# Patient Record
Sex: MALE | Race: WHITE | Employment: UNEMPLOYED | URBAN - METROPOLITAN AREA
[De-identification: names, ages, dates, MRNs, and addresses within clinical notes are randomized per-mention and may not be internally consistent; named-entity substitution may affect disease eponyms.]

---

## 2017-09-28 ENCOUNTER — HOSPITAL ENCOUNTER (OUTPATIENT)
Dept: SURGERY | Age: 52
Discharge: HOME | End: 2017-09-28

## 2017-09-28 VITALS — WEIGHT: 218 LBS | HEIGHT: 67 IN | BODY MASS INDEX: 34.21 KG/M2

## 2017-09-28 NOTE — PERIOP NOTES
Patient and pt's mother, Shelli Mcgovern, verified name, , and procedure. Type: 1a; abbreviated assessment per anesthesia guidelines  Labs per surgeon: none  Labs per anesthesia: none    Instructed pt that they will be notified via office/GI LAB for time of arrival to GI lab. Follow diet and prep instructions per office. May have clear liquids until 4 hours prior to time of arrival.     [place] R[place] or shower the night before and the am of surgery with antibacterial soap. No lotions, oils, powders, cologne on skin. No make up, eye make up or jewelry. Wear loose fitting comfortable, clean clothing. Must have adult present in building the entire time. Medications for the day of procedure- vytorin; patient to hold- fenofibrate, losartan, actos DOS    The following discharge instructions reviewed with patient: medication given during procedure may cause drowsiness for several hours, therefore, do not drive or operate machinery for remainder of the day. You may not drink alcohol on the day of your procedure, please resume regular diet and activity unless otherwise directed. You may experience abdominal distention for several hours that is relieved by the passage of gas. Contact your physician if you have any of the following: fever or chills, severe abdominal pain or excessive amount of bleeding or a large amount when having a bowel movement.  Occasional specks of blood with bowel movement would not be unusual.

## 2017-10-04 ENCOUNTER — ANESTHESIA EVENT (OUTPATIENT)
Dept: ENDOSCOPY | Age: 52
End: 2017-10-04
Payer: MEDICARE

## 2017-10-05 ENCOUNTER — HOSPITAL ENCOUNTER (OUTPATIENT)
Age: 52
Setting detail: OUTPATIENT SURGERY
Discharge: HOME | End: 2017-10-05
Attending: SURGERY | Admitting: SURGERY
Payer: MEDICARE

## 2017-10-05 ENCOUNTER — ANESTHESIA (OUTPATIENT)
Dept: ENDOSCOPY | Age: 52
End: 2017-10-05
Payer: MEDICARE

## 2017-10-05 VITALS
OXYGEN SATURATION: 97 % | SYSTOLIC BLOOD PRESSURE: 124 MMHG | WEIGHT: 208.56 LBS | HEART RATE: 64 BPM | DIASTOLIC BLOOD PRESSURE: 78 MMHG | TEMPERATURE: 97.5 F | RESPIRATION RATE: 15 BRPM | BODY MASS INDEX: 32.67 KG/M2

## 2017-10-05 LAB — GLUCOSE BLD STRIP.AUTO-MCNC: 86 MG/DL (ref 65–100)

## 2017-10-05 PROCEDURE — 77030009426 HC FCPS BIOP ENDOSC BSC -B: Performed by: SURGERY

## 2017-10-05 PROCEDURE — 76060000032 HC ANESTHESIA 0.5 TO 1 HR: Performed by: SURGERY

## 2017-10-05 PROCEDURE — 74011000250 HC RX REV CODE- 250

## 2017-10-05 PROCEDURE — 82962 GLUCOSE BLOOD TEST: CPT

## 2017-10-05 PROCEDURE — 74011250636 HC RX REV CODE- 250/636

## 2017-10-05 PROCEDURE — 88305 TISSUE EXAM BY PATHOLOGIST: CPT | Performed by: SURGERY

## 2017-10-05 PROCEDURE — 76040000025: Performed by: SURGERY

## 2017-10-05 PROCEDURE — 74011250636 HC RX REV CODE- 250/636: Performed by: ANESTHESIOLOGY

## 2017-10-05 PROCEDURE — 77030011640 HC PAD GRND REM COVD -A: Performed by: SURGERY

## 2017-10-05 RX ORDER — PROPOFOL 10 MG/ML
INJECTION, EMULSION INTRAVENOUS
Status: DISCONTINUED | OUTPATIENT
Start: 2017-10-05 | End: 2017-10-05 | Stop reason: HOSPADM

## 2017-10-05 RX ORDER — SODIUM CHLORIDE 0.9 % (FLUSH) 0.9 %
5-10 SYRINGE (ML) INJECTION AS NEEDED
Status: DISCONTINUED | OUTPATIENT
Start: 2017-10-05 | End: 2017-10-05 | Stop reason: HOSPADM

## 2017-10-05 RX ORDER — OXYCODONE AND ACETAMINOPHEN 10; 325 MG/1; MG/1
1 TABLET ORAL AS NEEDED
Status: DISCONTINUED | OUTPATIENT
Start: 2017-10-05 | End: 2017-10-05 | Stop reason: HOSPADM

## 2017-10-05 RX ORDER — MIDAZOLAM HYDROCHLORIDE 1 MG/ML
2 INJECTION, SOLUTION INTRAMUSCULAR; INTRAVENOUS ONCE
Status: COMPLETED | OUTPATIENT
Start: 2017-10-05 | End: 2017-10-05

## 2017-10-05 RX ORDER — LIDOCAINE HYDROCHLORIDE 20 MG/ML
INJECTION, SOLUTION EPIDURAL; INFILTRATION; INTRACAUDAL; PERINEURAL AS NEEDED
Status: DISCONTINUED | OUTPATIENT
Start: 2017-10-05 | End: 2017-10-05 | Stop reason: HOSPADM

## 2017-10-05 RX ORDER — HYDROMORPHONE HYDROCHLORIDE 2 MG/ML
0.5 INJECTION, SOLUTION INTRAMUSCULAR; INTRAVENOUS; SUBCUTANEOUS
Status: DISCONTINUED | OUTPATIENT
Start: 2017-10-05 | End: 2017-10-05 | Stop reason: HOSPADM

## 2017-10-05 RX ORDER — PROPOFOL 10 MG/ML
INJECTION, EMULSION INTRAVENOUS AS NEEDED
Status: DISCONTINUED | OUTPATIENT
Start: 2017-10-05 | End: 2017-10-05 | Stop reason: HOSPADM

## 2017-10-05 RX ORDER — SODIUM CHLORIDE, SODIUM LACTATE, POTASSIUM CHLORIDE, CALCIUM CHLORIDE 600; 310; 30; 20 MG/100ML; MG/100ML; MG/100ML; MG/100ML
75 INJECTION, SOLUTION INTRAVENOUS CONTINUOUS
Status: DISCONTINUED | OUTPATIENT
Start: 2017-10-05 | End: 2017-10-05 | Stop reason: HOSPADM

## 2017-10-05 RX ORDER — OXYCODONE HYDROCHLORIDE 5 MG/1
5 TABLET ORAL
Status: DISCONTINUED | OUTPATIENT
Start: 2017-10-05 | End: 2017-10-05 | Stop reason: HOSPADM

## 2017-10-05 RX ADMIN — MIDAZOLAM HYDROCHLORIDE 2 MG: 1 INJECTION, SOLUTION INTRAMUSCULAR; INTRAVENOUS at 10:03

## 2017-10-05 RX ADMIN — PROPOFOL 160 MCG/KG/MIN: 10 INJECTION, EMULSION INTRAVENOUS at 10:15

## 2017-10-05 RX ADMIN — SODIUM CHLORIDE, SODIUM LACTATE, POTASSIUM CHLORIDE, AND CALCIUM CHLORIDE 75 ML/HR: 600; 310; 30; 20 INJECTION, SOLUTION INTRAVENOUS at 10:02

## 2017-10-05 RX ADMIN — LIDOCAINE HYDROCHLORIDE 40 MG: 20 INJECTION, SOLUTION EPIDURAL; INFILTRATION; INTRACAUDAL; PERINEURAL at 10:15

## 2017-10-05 RX ADMIN — PROPOFOL 40 MG: 10 INJECTION, EMULSION INTRAVENOUS at 10:15

## 2017-10-05 RX ADMIN — PROPOFOL 50 MG: 10 INJECTION, EMULSION INTRAVENOUS at 10:14

## 2017-10-05 NOTE — ANESTHESIA PREPROCEDURE EVALUATION
Anesthetic History   No history of anesthetic complications            Review of Systems / Medical History  Patient summary reviewed and pertinent labs reviewed    Pulmonary  Within defined limits                 Neuro/Psych   Within defined limits           Cardiovascular    Hypertension: well controlled          CAD    Exercise tolerance: >4 METS  Comments: Denies CAD-nl perfusion, nl EF on nuclear stress 2013   GI/Hepatic/Renal                Endo/Other    Diabetes: well controlled, type 2         Other Findings              Physical Exam    Airway  Mallampati: II  TM Distance: > 6 cm    Mouth opening: Normal     Cardiovascular    Rhythm: regular           Dental    Dentition: Poor dentition     Pulmonary                 Abdominal  GI exam deferred       Other Findings            Anesthetic Plan    ASA: 2  Anesthesia type: total IV anesthesia          Induction: Intravenous  Anesthetic plan and risks discussed with: Patient

## 2017-10-05 NOTE — H&P
Donovan Cortes MD  [facility]  [place], [address], [place]  [phone]     Jenny Raines  MRN: [id number]  I have seen and examined this patient today and imported their H&P from clinic below. I have reviewed it and there are no interval changes. I have gone over the risks and benefits of the procedure in detail in the office and have given the patient the opportunity to ask questions and have answered them to their satisfaction. The patient is cleared and wishes to proceed with a colonoscopy for the indication of screening today. Georgette Kellogg MD      PCP: Russel Jones MD     HISTORY OF PRESENT ILLNESS:  Jenny Raines is a 50y.o. year old male sent to me in consultation from Russel Jones MD  to discuss colorectal cancer screening and colonoscopy. The patient has not had a colonoscopy before. no family history of colon/rectal cancer. no family history of colon/rectal polyps. There is no history of HNPCC associated cancers.       There have been no changes in bowel habits; no problems with diarrhea, constipation, or change in stool size. No bleeding, no hematochezia, no melena. No weight loss, fevers, chills, or other constitutional symptoms. No abdominal pain/cramping. No nausea/vomiting. No perianal itching/burning, protrusion/swelling, or rectal discharge. No incontinence of gas/liquid/solid. Has normal bowel movements. Stool consistency is soft. No straining for BMs. Dietary fiber only. No fiber supplements. No stool softeners, laxatives, or enemas. No pain with BM.       REVIEW OF SYSTEMS:  Constitutional: No fevers/chills, no weakness, no fatigue, no lightheadedness, no night sweats, no insomnia, no appetite changes, no weight changes, no memory problems.    Eyes: No changes in vision, no diplopia, no tearing, no scotomata, no pain   Ears/Nose/Throat/Mouth:  No change in hearing, no tinnitus, no bleeding, no epistaxis, no nasal discharge, no sinusitis. Respiratory: No cough, no dyspnea, no wheezing, no hemoptysis, no sleep apnea   Cardiovascular: No chest pains, no chest pressure, no chest tightness, no palpitations   Gastrointestinal: No abdominal pains, no bowel habit changes, no nausea, no emesis, no hematochezia, no melena, no cramping, no constipation, no diarrhea, no incontinence, no jaundice, no diverticulosis. Otherwise per HPI   Genitourinary: No dysuria, no hematuria, no urinary frequency, no nocturia, no recent UTIs   Musculoskeletal: No back/neck pain, no arthritis, no joint pain/swelling, no muscle pains   Skin: No rashes, no itching, no ulcers   Hematologic:  No easy bruising, no anemia   Neurologic: No headaches, no dizziness, no seizures   Psychiatric: No depressive symptoms, no anxiety symptoms, no changes in mood, no substance abuse      PAST MEDICAL HISTORY:          Past Medical History:   Diagnosis Date    Diabetes ([facility] [address]      Hypercholesterolemia      Hypertension           PAST SURGICAL HISTORY:           Past Surgical History:   Procedure Laterality Date    HX ORTHOPAEDIC Right 1997     right leg surgery    HX OTHER SURGICAL         Lazy eye surgery-left eye    HX TONSILLECTOMY             ALLERGIES: No Known Allergies     HOME MEDICATIONS:        Current Outpatient Prescriptions   Medication Sig    pioglitazone (ACTOS) 30 mg tablet TAKE 1 TABLET BY MOUTH EVERY DAY    fenofibrate micronized (LOFIBRA) 134 mg capsule Take 1 Cap by mouth every morning.  losartan (COZAAR) 50 mg tablet Take 1 Tab by mouth daily.  levocetirizine (XYZAL) 5 mg tablet Take 1 Tab by mouth daily.  Indications: ALLERGIC RHINITIS    glucose blood VI test strips (ONETOUCH ULTRA TEST) strip Use daily for management of Type 2 DM  (E11.9)    ezetimibe-simvastatin (VYTORIN 10/40) 10-40 mg per tablet TAKE 1 TABLET BY MOUTH EVERY DAY    cholecalciferol, VITAMIN D3, (VITAMIN D3) 5,000 unit tab tablet Take  by mouth daily. Two a day      No current facility-administered medications for this visit.          SOCIAL HISTORY:    Social History            Social History    Marital status: SINGLE       Spouse name: N/A    Number of children: N/A    Years of education: N/A          Occupational History    Not on file.           Social History Main Topics    Smoking status: Never Smoker    Smokeless tobacco: Never Used    Alcohol use No    Drug use: Not on file    Sexual activity: Not on file           Other Topics Concern    Not on file      Social History Narrative               Family History   Problem Relation Age of Onset    Diabetes Mother           PHYSICAL EXAM:  Blood pressure 112/65, pulse (!) 55, height 5' 10\" (1.778 m), weight 215 lb 1.9 oz (97.6 kg). Body mass index is 30.87 kg/(m^2). All female patients are examined in the presence of my Nurse or a medical assistant and at the request of male patients. General: Normotensive, no acute distress, well developed, well nourished appearing   Head:  AT/NC, no lesions   Eyes:  PERRLA, EOM's full, conjunctivae clear   Neck:  Supple, no masses, no lymphadenopathy, no thyromegaly, no carotid bruits   Chest:  Lungs clear, no rales, no rhonchi, no wheezes   Heart:  RRR, no rubs, no gallops   Abdomen:  Soft, no tenderness, no rebound, no guarding, no masses, nondistended. Rectal:  deferred   Back:  Normal curvature, no tenderness. Negative Ho's punch. Extremities:  FROM, no deformities, no edema, no erythema   Neuro:  Physiologic, oriented x3, full affect, no localizing findings   Skin:  No rash                  ASSESSMENT:  Age related colon cancer risk/screening   As well as risk factors related to family history and current symptoms. Were discussed. PLAN:  --The patient was counseled on the risks and benefits of colonoscopy.   Major risks include bleeding (1/1000), perforation (1/1000), missed lesions, and reactions to sedation medications or bowel prep. I explained the risks and benefits of colonoscopy. - All questions were answered to the patient's satisfaction. The colonoscopy will be scheduled at the patients next convenient time. The patient will follow up with me to discuss any pathology. If it is benign the patient is given the option to receive a phone call instead of the visit.     Fax communication sent to Perla Singh MD       Greater than 50% of this 30 minute visit was spent on counseling, discussing the rationale behind colon cancer screening, the colonoscopy procedure, risks, benefits, and alternatives.       Donovan Sánchez MD

## 2017-10-05 NOTE — LETTER
10/5/2017 10:34 AM 
 
RE:    Raimundo Rosa [address] [facility] [place] [facility] [address] Donovan Montoya MD 
[facility] Endoscopy Director of Robotic Surgery [facility], [address] [place] [address] 
[phone] You had one small polyp which was biopsied and sent for pathology. I will call you with results next week. I believe that it will be a 5 year follow up but will update you next week. You have NO Diverticulosis which we briefly discussed in office. It is a benign process in which too much fat and too little fiber cause small weak areas in the sigmoid colon. Most people never have any issues. It is very common and most people by the age of 61 will have them is the [place]  Rarely they can become infected and cause Diverticulitis with fevers and left lower or mid lower abdominal pain that persists for more than a day. It is usually treated by your Primary Doctor with oral antibiotics. I advise you to take supplemental fiber in powder form everyday for colon health and a stool softener daily as well to keep your stools consistently soft. How long? Forever and ever amen. You also have NO internal hemorrhoids which if they flare up may cause itching or burning and rarely some bleeding. The treatment is the same as above. Call me if you have questions tomorrow that are not urgent. Otherwise my nurse will be taking calls for me until 5 pm and can get a hold of me. Thank you very much for choosing my office for your colonoscopy screening needs.    
 
Reta Sanz MD

## 2017-10-05 NOTE — Clinical Note
Radha Rasheed, He had one small polyp in the sigmoid otherwise normal.  I will update both of you and the HM when the pathology returns. Thanks for sending him. Yaron Serrano.

## 2017-10-05 NOTE — IP AVS SNAPSHOT
[address] 
[phone] Patient: Arpan Castro MRN: [id number] :1965 You are allergic to the following No active allergies Recent Documentation Weight BMI Smoking Status 94.6 kg 32.67 kg/m2 Never Smoker Emergency Contacts Name Discharge Info Relation Home Work Mobile Azucena Ochoa DISCHARGE CAREGIVER [3] Mother [14] [phone] About your hospitalization You were admitted on:  2017 You last received care in the:  [facility] ENDOSCOPY You were discharged on:  2017 Unit phone number:  [phone] Why you were hospitalized Your primary diagnosis was:  Not on File Providers Seen During Your Hospitalizations Provider Role Specialty Primary office phone Nedra Lira MD Attending Provider General Surgery [phone] Your Primary Care Physician (PCP) Primary Care Physician Office Phone Office Fax [address] [address] [phone] Follow-up Information Follow up With Details Comments Contact Info Nedra Lira MD   [address] [place] [address] [address] [facility] [place] [address] 
[phone] Your Appointments 2017  9:15 AM EDT  
LAB with [facility] ([address] [place] [address]) [address] [place] [id number] [phone] Current Discharge Medication List  
  
ASK your doctor about these medications Dose & Instructions Dispensing Information Comments Morning Noon Evening Bedtime  
 cholecalciferol (VITAMIN D3) 5,000 unit Tab tablet Commonly known as:  VITAMIN D3 Your last dose was: Your next dose is: Take  by mouth daily. Two a day Refills:  0  
     
   
   
   
  
 ezetimibe-simvastatin 10-40 mg per tablet Commonly known as:  Vytorin 10/40 Your last dose was: Your next dose is: TAKE 1 TABLET BY MOUTH EVERY DAY Quantity:  30 Tab Refills:  5  
     
   
   
   
  
 fenofibrate micronized 134 mg capsule Commonly known as:  LOFIBRA Your last dose was: Your next dose is:    
   
   
 Dose:  134 mg Take 1 Cap by mouth every morning. Quantity:  30 Cap Refills:  5  
     
   
   
   
  
 glucose blood VI test strips strip Commonly known as:  ONETOUCH ULTRA TEST Your last dose was: Your next dose is:    
   
   
 Use daily for management of Type 2 DM  (E11.9) Quantity:  100 Strip Refills:  3 Further requests to Dr. Basilia Moreau  
    
   
   
   
  
 levocetirizine 5 mg tablet Commonly known as:  Neha Ala Your last dose was: Your next dose is:    
   
   
 Dose:  5 mg Take 1 Tab by mouth daily. Indications: ALLERGIC RHINITIS Quantity:  30 Tab Refills:  5  
     
   
   
   
  
 losartan 50 mg tablet Commonly known as:  COZAAR Your last dose was: Your next dose is:    
   
   
 Dose:  50 mg Take 1 Tab by mouth daily. Quantity:  30 Tab Refills:  5  
     
   
   
   
  
 pioglitazone 30 mg tablet Commonly known as:  ACTOS Your last dose was: Your next dose is: TAKE 1 TABLET BY MOUTH EVERY DAY Quantity:  30 Tab Refills:  5 Discharge Instructions Gastrointestinal Colonoscopy/Flexible Sigmoidoscopy Lower Exam Discharge Instructions 1. Call your doctor for any problems or questions. 2. Contact the doctors office for follow up appointment as directed 3. Medication may cause drowsiness for several hours, therefore, do not drive or operate machinery for remainder of the day. 4. No alcohol today. 5. Ordinarily, you may resume regular diet and activity after exam unless otherwise specified by your physician. 6. Because of air put into your colon during exam, you may experience some abdominal distension, relieved by the passage of gas, for several hours. 7. Contact your physician if you have any of the following: 
a. Excessive amount of bleeding  large amount when having a bowel movement. Occasional specks of blood with bowel movement would not be unusual. 
b. Severe abdominal pain 
c. Fever or Chills Discharge Orders None Introducing [facility]! Ricci Frances introduces [employer] patient portal. Now you can access parts of your medical record, email your doctor's office, and request medication refills online. 1. In your internet browser, go to [email or web address] 2. Click on the First Time User? Click Here link in the Sign In box. You will see the New Member Sign Up page. 3. Enter your [employer] Access Code exactly as it appears below. You will not need to use this code after youve completed the sign-up process. If you do not sign up before the expiration date, you must request a new code. · [employer] Access Code: [email or web address] Expires: 10/29/2017  9:25 AM 
 
4. Enter the last four digits of your Social Security Number (xxxx) and Date of Birth (mm/dd/yyyy) as indicated and click Submit. You will be taken to the next sign-up page. 5. Create a [employer] ID. This will be your [employer] login ID and cannot be changed, so think of one that is secure and easy to remember. 6. Create a [employer] password. You can change your password at any time. 7. Enter your Password Reset Question and Answer. This can be used at a later time if you forget your password. 8. Enter your e-mail address. You will receive e-mail notification when new information is available in [address] 9. Click Sign Up. You can now view and download portions of your medical record. 10. Click the Download Summary menu link to download a portable copy of your medical information. If you have questions, please visit the Frequently Asked Questions section of the MyChart website. Remember, MyChart is NOT to be used for urgent needs. For medical emergencies, dial 911. Now available from your iPhone and Android! General Information Please provide this summary of care documentation to your next provider. Patient Signature:  ____________________________________________________________ Date:  ____________________________________________________________  
  
[place] [facility] Provider Signature:  ____________________________________________________________ Date:  ____________________________________________________________

## 2017-10-05 NOTE — OP NOTES
Donovan Lobo MD  [facility]  [place], [address], [place]  [phone]        Colonoscopy Procedure Note    Chay Tong  1965  [id number]    Indications:    Screening colonoscopy     Pre-operative Diagnosis:   Screening colonoscopy    Post-operative Diagnosis: single 5 mm polyp in the sigmoid colon,  cold biopsied and sent for pathology  Procedure:  Colonoscopy, Terminal Ileoscopy normal, Anoscopy, Digital Rectal Examination. Surgeon: Gabriel Silveira MD    Referring Provider: Tricia Oh MD    Sedation:  MAC anesthesia Propofol    Pre-Procedure Exam:  Airway: clear   Heart: normal S1and S2    Lungs: clear bilateral  Abdomen: soft, nontender, bowel sounds present and normal in all quadrants   Mental Status: awake, alert, and oriented to person, place, and time    Procedure in Detail:  Informed consent was obtained for the procedure after delineating the risks, benefits, and alternatives to the procedure. Specific risks of perforation (1/1000), hemorrhage (1/1000), missed lesions, adverse drug reaction, and aspiration were discussed. The patient was placed in the left lateral decubitus position. Based on the pre-procedure assessment, including review of the patient's medical history, medications, and allergies, moderate sedation was felt to be appropriate. The aforementioned medications were given in an incremental fashion to achieve adequate sedation. The patient was monitored continuously with ECG tracing, pulse oximetry, blood pressure monitoring, and direct observations. A digital rectal examination and anoscopy were performed and normal. The Olympus video colonoscope [id number] was inserted per anus and advanced under direct vision to the cecum, which was identified by the ileocecal valve. Terminal Ileoscopy was performed and normal.  The quality of the colonic preparation was good.   The colonoscope was slowly withdrawn using a to-and-fro and circumferential motion over 10 minutes with careful examination between folds. Retroflexion was performed in the rectum. Appropriate photodocumentation was obtained. Findings:   Rectum: No internal hemorrhoids. No polyps, masses, bleeding, or mucosal abnormalities. Sigmoid: No incidental diverticulosis. As above  Descending Colon: Normal.  No polyps, masses, bleeding, or mucosal abnormalities. Transverse Colon: Normal. No polyps, masses, bleeding, or mucosal abnormalities. Ascending Colon: Normal.  No polyps, masses, bleeding, or mucosal abnormalities. Cecum: Normal.  No polyps, masses, bleeding, or mucosal abnormalities. Terminal Ileum: no lesions    Therapies:  As above    Implants: None    Specimen:  AS ABOVE    Complications: None; patient tolerated the procedure well. EBL:  MINIMAL    Recommendations:   -Await pathology.    -Follow up with Dr Patrizia Finch for interval symptoms.     Terrence Gotti MD  10/5/2017  10:32 AM

## 2017-10-05 NOTE — ANESTHESIA POSTPROCEDURE EVALUATION
Post-Anesthesia Evaluation and Assessment    Patient: Andreas Orosco MRN: [id number]  SSN: [id number]    YOB: 1965  Age: 50 y.o. Sex: male       Cardiovascular Function/Vital Signs  Visit Vitals    BP (P) 96/52    Pulse (!) (P) 58    Temp 36.4 °C (97.5 °F)    Resp (P) 15    Wt 94.6 kg (208 lb 9 oz)    SpO2 (P) 94%    BMI 32.67 kg/m2       Patient is status post total IV anesthesia anesthesia for Procedure(s):  COLONOSCOPY/ 31  ENDOSCOPIC POLYPECTOMY. Nausea/Vomiting: None    Postoperative hydration reviewed and adequate. Pain:  Pain Scale 1: (P) Visual (10/05/17 1043)  Pain Intensity 1: (P) 0 (10/05/17 1043)   Managed    Neurological Status:   Neuro (WDL): (P) Within Defined Limits (10/05/17 1043)  Neuro  Neurologic State: (P) Lethargic (10/05/17 1043)   At baseline    Mental Status and Level of Consciousness: Arousable    Pulmonary Status:   O2 Device: (P) Nasal cannula (10/05/17 1048)   Adequate oxygenation and airway patent    Complications related to anesthesia: None    Post-anesthesia assessment completed.  No concerns    Signed By: Donald Glaser MD     October 5, 2017

## 2017-10-07 NOTE — PROGRESS NOTES
Val Martines,  He will need a 10 year follow. I will update the HM and call him today and leave a message if no answer. Thank you. Liam see above.

## 2018-12-04 PROBLEM — Z23 ENCOUNTER FOR IMMUNIZATION: Status: ACTIVE | Noted: 2018-12-04

## 2019-09-23 PROBLEM — Z23 ENCOUNTER FOR IMMUNIZATION: Status: RESOLVED | Noted: 2018-12-04 | Resolved: 2019-09-23

## (undated) DEVICE — KENDALL RADIOLUCENT FOAM MONITORING ELECTRODE RECTANGULAR SHAPE: Brand: KENDALL

## (undated) DEVICE — FCPS BX HOT RJ4 2.2MMX240CM -- RADIAL JAW 4 BX/40

## (undated) DEVICE — CANNULA NSL ORAL AD FOR CAPNOFLEX CO2 O2 AIRLFE

## (undated) DEVICE — SYR 3ML LL TIP 1/10ML GRAD --

## (undated) DEVICE — CONTAINER PREFIL FRMLN 40ML --

## (undated) DEVICE — NDL PRT INJ NSAF BLNT 18GX1.5 --

## (undated) DEVICE — SYR 5ML 1/5 GRAD LL NSAF LF --

## (undated) DEVICE — CONNECTOR TBNG OD5-7MM O2 END DISP

## (undated) DEVICE — REM POLYHESIVE ADULT PATIENT RETURN ELECTRODE: Brand: VALLEYLAB